# Patient Record
Sex: MALE
[De-identification: names, ages, dates, MRNs, and addresses within clinical notes are randomized per-mention and may not be internally consistent; named-entity substitution may affect disease eponyms.]

---

## 2018-06-13 ENCOUNTER — HOSPITAL ENCOUNTER (OUTPATIENT)
Dept: HOSPITAL 92 - SDC | Age: 38
Discharge: HOME | End: 2018-06-13
Attending: OTOLARYNGOLOGY
Payer: COMMERCIAL

## 2018-06-13 VITALS — BODY MASS INDEX: 29 KG/M2

## 2018-06-13 DIAGNOSIS — J34.3: ICD-10-CM

## 2018-06-13 DIAGNOSIS — J34.89: ICD-10-CM

## 2018-06-13 DIAGNOSIS — J34.2: Primary | ICD-10-CM

## 2018-06-13 DIAGNOSIS — J35.1: ICD-10-CM

## 2018-06-13 PROCEDURE — 09TL0ZZ RESECTION OF NASAL TURBINATE, OPEN APPROACH: ICD-10-PCS | Performed by: OTOLARYNGOLOGY

## 2018-06-13 PROCEDURE — 09SM0ZZ REPOSITION NASAL SEPTUM, OPEN APPROACH: ICD-10-PCS | Performed by: OTOLARYNGOLOGY

## 2018-06-13 PROCEDURE — 93005 ELECTROCARDIOGRAM TRACING: CPT

## 2018-06-13 PROCEDURE — 93010 ELECTROCARDIOGRAM REPORT: CPT

## 2018-06-13 NOTE — EKG
Test Reason : PREOP

Blood Pressure : ***/*** mmHG

Vent. Rate : 067 BPM     Atrial Rate : 067 BPM

   P-R Int : 134 ms          QRS Dur : 096 ms

    QT Int : 402 ms       P-R-T Axes : 042 -05 012 degrees

   QTc Int : 424 ms

 

Normal sinus rhythm

Minimal voltage criteria for LVH, may be normal variant

Borderline ECG

No previous ECGs available

Confirmed by KARLI ROJAS ([id number]) on 6/13/2018 12:22:40 PM

 

Referred By:  JACY           Confirmed By:KARLI ROJAS

## 2018-06-14 NOTE — OP
PREOPERATIVE DIAGNOSES:

1.  Nasal septal deviation.

2.  Bilateral inferior turbinate hypertrophy.

3.  Nasal obstruction.

 

POSTOPERATIVE DIAGNOSES:

1.  Nasal septal deviation.

2.  Bilateral inferior turbinate hypertrophy.

3.  Nasal obstruction.

 

PROCEDURES:

1.  Nasal septoplasty.

2.  Bilateral inferior turbinate submucosal resection.

 

SURGEON:  Darrian Narvaez M.D.

 

ESTIMATED BLOOD LOSS:  20 mL.

 

COMPLICATIONS:  None.

 

ANESTHESIA:  GETA.

 

PROCEDURE IN DETAIL: Patient was taken to the operating room and placed supine on the table. General 
endotracheal anesthesia was obtained by the Anesthesia staff. Tube was secured in the left lower lip.
 Patient was then placed in the beach chair position, and Afrin pledgets were placed in the nasal cav
ity. Injections of 1% lidocaine with 1:100,000 epinephrine were made into the nasal septum as well as
 the inferior turbinates. Patient was then prepped and draped in standard surgical fashion for nasal 
surgery. Following this, the Afrin pledgets were removed. A Yaak incision was made on the left murali
al septum. Submucoperichondrial dissection was performed. The deviated portions of the septum include
d portions of the cartilage and the bony septum. These isolated areas were removed using three cuttin
g rongeurs. There was noted to be a large dorsal and caudal strut, left intact for support of the nos
e. The mucoperichondrial flaps were then reapproximated using a 4-0 gut stitch. Any straight pieces o
f cartilage were crushed prior to this and placed between the mucoperichondrial flaps. Following this
, the inferior turbinates were then punctured with a submucosal coblation wand, and submucosal coblat
ions were performed of multiple areas of the inferior portion of the anterior inferior turbinate.

 

Please note that the submucosal microdebrider was used to submucosally resect the anterior and inferi
or portions of the inferior turbinates bilaterally.  Patient tolerated the procedure well.